# Patient Record
(demographics unavailable — no encounter records)

---

## 2025-03-18 NOTE — HISTORY OF PRESENT ILLNESS
[FreeTextEntry1] : NP - CPE  [de-identified] : 23 yo here for CPE to establish car.e  has chronic anxiety. has chronic chest pain and palpitations. has decreased appetite. got worse when grandpa passed away a year ago.  had panic attacks here and there. last panic attack was several years (2022). usually does breathing exercises. has therapist 45 minutes sessions teaches coping mechanism. is trying to keep with school.  has not been drinking enough water for the past several days.

## 2025-03-18 NOTE — HEALTH RISK ASSESSMENT
[Very Good] : ~his/her~  mood as very good [Yes] : Yes [Never (0 pts)] : Never (0 points) [0] : 2) Feeling down, depressed, or hopeless: Not at all (0) [# of Members in Household ___] :  household currently consist of [unfilled] member(s) [Student] : student [College] : College [Feels Safe at Home] : Feels safe at home [Fully functional (bathing, dressing, toileting, transferring, walking, feeding)] : Fully functional (bathing, dressing, toileting, transferring, walking, feeding) [Fully functional (using the telephone, shopping, preparing meals, housekeeping, doing laundry, using] : Fully functional and needs no help or supervision to perform IADLs (using the telephone, shopping, preparing meals, housekeeping, doing laundry, using transportation, managing medications and managing finances) [Never] : Never [de-identified] : goes to the gym  [de-identified] : well balanced  [[id number]] : 0 [FreeTextEntry2] : [employer].

## 2025-03-18 NOTE — ASSESSMENT
[FreeTextEntry1] : anxiety - psych, not ready to start SSRIs discussed with patient  cpe - blood work, tdap given today pt tolerated well